# Patient Record
Sex: MALE | Race: BLACK OR AFRICAN AMERICAN | Employment: UNEMPLOYED | URBAN - METROPOLITAN AREA
[De-identification: names, ages, dates, MRNs, and addresses within clinical notes are randomized per-mention and may not be internally consistent; named-entity substitution may affect disease eponyms.]

---

## 2017-02-14 ENCOUNTER — APPOINTMENT (OUTPATIENT)
Dept: GENERAL RADIOLOGY | Age: 57
End: 2017-02-14
Attending: NURSE PRACTITIONER
Payer: MEDICARE

## 2017-02-14 ENCOUNTER — HOSPITAL ENCOUNTER (EMERGENCY)
Age: 57
Discharge: HOME | End: 2017-02-14
Attending: EMERGENCY MEDICINE
Payer: MEDICARE

## 2017-02-14 VITALS
HEART RATE: 74 BPM | SYSTOLIC BLOOD PRESSURE: 115 MMHG | OXYGEN SATURATION: 98 % | TEMPERATURE: 98 F | RESPIRATION RATE: 20 BRPM | DIASTOLIC BLOOD PRESSURE: 66 MMHG

## 2017-02-14 DIAGNOSIS — N18.6 ESRD (END STAGE RENAL DISEASE) ON DIALYSIS (HCC): Primary | ICD-10-CM

## 2017-02-14 DIAGNOSIS — Z99.2 ESRD (END STAGE RENAL DISEASE) ON DIALYSIS (HCC): Primary | ICD-10-CM

## 2017-02-14 DIAGNOSIS — R07.9 CHEST PAIN, UNSPECIFIED TYPE: ICD-10-CM

## 2017-02-14 LAB
ALBUMIN SERPL BCP-MCNC: 3.4 G/DL (ref 3.4–5)
ALBUMIN/GLOB SERPL: 0.7 {RATIO} (ref 0.8–1.7)
ALP SERPL-CCNC: 231 U/L (ref 45–117)
ALT SERPL-CCNC: 33 U/L (ref 16–61)
ANION GAP BLD CALC-SCNC: 7 MMOL/L (ref 3–18)
AST SERPL W P-5'-P-CCNC: 23 U/L (ref 15–37)
BASOPHILS # BLD AUTO: 0.1 K/UL (ref 0–0.06)
BASOPHILS # BLD: 2 % (ref 0–2)
BILIRUB DIRECT SERPL-MCNC: <0.1 MG/DL (ref 0–0.2)
BILIRUB SERPL-MCNC: 0.2 MG/DL (ref 0.2–1)
BNP SERPL-MCNC: 845 PG/ML (ref 0–900)
BUN SERPL-MCNC: 26 MG/DL (ref 7–18)
BUN/CREAT SERPL: 5 (ref 12–20)
CALCIUM SERPL-MCNC: 7.6 MG/DL (ref 8.5–10.1)
CHLORIDE SERPL-SCNC: 97 MMOL/L (ref 100–108)
CK MB CFR SERPL CALC: 0.5 % (ref 0–4)
CK MB CFR SERPL CALC: 0.5 % (ref 0–4)
CK MB SERPL-MCNC: 0.7 NG/ML (ref 0.5–3.6)
CK MB SERPL-MCNC: 0.8 NG/ML (ref 0.5–3.6)
CK SERPL-CCNC: 132 U/L (ref 39–308)
CK SERPL-CCNC: 158 U/L (ref 39–308)
CO2 SERPL-SCNC: 35 MMOL/L (ref 21–32)
CREAT SERPL-MCNC: 5.29 MG/DL (ref 0.6–1.3)
DIFFERENTIAL METHOD BLD: ABNORMAL
EOSINOPHIL # BLD: 0.1 K/UL (ref 0–0.4)
EOSINOPHIL NFR BLD: 2 % (ref 0–5)
ERYTHROCYTE [DISTWIDTH] IN BLOOD BY AUTOMATED COUNT: 14.6 % (ref 11.6–14.5)
GLOBULIN SER CALC-MCNC: 4.8 G/DL (ref 2–4)
GLUCOSE SERPL-MCNC: 122 MG/DL (ref 74–99)
HCT VFR BLD AUTO: 37.4 % (ref 36–48)
HGB BLD-MCNC: 12.6 G/DL (ref 13–16)
LIPASE SERPL-CCNC: 271 U/L (ref 73–393)
LYMPHOCYTES # BLD AUTO: 31 % (ref 21–52)
LYMPHOCYTES # BLD: 1.5 K/UL (ref 0.9–3.6)
MCH RBC QN AUTO: 33.1 PG (ref 24–34)
MCHC RBC AUTO-ENTMCNC: 33.7 G/DL (ref 31–37)
MCV RBC AUTO: 98.2 FL (ref 74–97)
MONOCYTES # BLD: 0.8 K/UL (ref 0.05–1.2)
MONOCYTES NFR BLD AUTO: 16 % (ref 3–10)
NEUTS SEG # BLD: 2.5 K/UL (ref 1.8–8)
NEUTS SEG NFR BLD AUTO: 49 % (ref 40–73)
PLATELET # BLD AUTO: 150 K/UL (ref 135–420)
PMV BLD AUTO: 9.2 FL (ref 9.2–11.8)
POTASSIUM SERPL-SCNC: 3.4 MMOL/L (ref 3.5–5.5)
PROT SERPL-MCNC: 8.2 G/DL (ref 6.4–8.2)
RBC # BLD AUTO: 3.81 M/UL (ref 4.7–5.5)
SODIUM SERPL-SCNC: 139 MMOL/L (ref 136–145)
TROPONIN I SERPL-MCNC: <0.02 NG/ML (ref 0–0.04)
TROPONIN I SERPL-MCNC: <0.02 NG/ML (ref 0–0.04)
WBC # BLD AUTO: 5 K/UL (ref 4.6–13.2)

## 2017-02-14 PROCEDURE — 74011250637 HC RX REV CODE- 250/637: Performed by: NURSE PRACTITIONER

## 2017-02-14 PROCEDURE — 80076 HEPATIC FUNCTION PANEL: CPT | Performed by: NURSE PRACTITIONER

## 2017-02-14 PROCEDURE — 99285 EMERGENCY DEPT VISIT HI MDM: CPT

## 2017-02-14 PROCEDURE — 93005 ELECTROCARDIOGRAM TRACING: CPT

## 2017-02-14 PROCEDURE — 82550 ASSAY OF CK (CPK): CPT | Performed by: NURSE PRACTITIONER

## 2017-02-14 PROCEDURE — 71010 XR CHEST PORT: CPT

## 2017-02-14 PROCEDURE — 83690 ASSAY OF LIPASE: CPT | Performed by: NURSE PRACTITIONER

## 2017-02-14 PROCEDURE — 80048 BASIC METABOLIC PNL TOTAL CA: CPT | Performed by: NURSE PRACTITIONER

## 2017-02-14 PROCEDURE — 85025 COMPLETE CBC W/AUTO DIFF WBC: CPT | Performed by: NURSE PRACTITIONER

## 2017-02-14 PROCEDURE — 83880 ASSAY OF NATRIURETIC PEPTIDE: CPT | Performed by: NURSE PRACTITIONER

## 2017-02-14 RX ORDER — BENZONATATE 100 MG/1
100 CAPSULE ORAL
COMMUNITY

## 2017-02-14 RX ORDER — QUETIAPINE FUMARATE 25 MG/1
25 TABLET, FILM COATED ORAL 2 TIMES DAILY
COMMUNITY

## 2017-02-14 RX ORDER — CINACALCET 90 MG/1
TABLET, FILM COATED ORAL
COMMUNITY

## 2017-02-14 RX ORDER — ONDANSETRON 4 MG/1
4 TABLET, ORALLY DISINTEGRATING ORAL
COMMUNITY

## 2017-02-14 RX ORDER — HYDROMORPHONE HYDROCHLORIDE 1 MG/ML
1 INJECTION, SOLUTION INTRAMUSCULAR; INTRAVENOUS; SUBCUTANEOUS
Status: DISCONTINUED | OUTPATIENT
Start: 2017-02-14 | End: 2017-02-14

## 2017-02-14 RX ORDER — SEVELAMER CARBONATE 800 MG/1
800 TABLET, FILM COATED ORAL 3 TIMES DAILY
COMMUNITY

## 2017-02-14 RX ORDER — PHENYTOIN SODIUM 100 MG/1
100 CAPSULE, EXTENDED RELEASE ORAL
COMMUNITY

## 2017-02-14 RX ORDER — GUAIFENESIN 100 MG/5ML
81 LIQUID (ML) ORAL
Status: COMPLETED | OUTPATIENT
Start: 2017-02-14 | End: 2017-02-14

## 2017-02-14 RX ORDER — OMEPRAZOLE 20 MG/1
20 CAPSULE, DELAYED RELEASE ORAL DAILY
COMMUNITY

## 2017-02-14 RX ORDER — GLUCOSAMINE SULFATE 1500 MG
POWDER IN PACKET (EA) ORAL DAILY
COMMUNITY

## 2017-02-14 RX ORDER — TAMSULOSIN HYDROCHLORIDE 0.4 MG/1
0.4 CAPSULE ORAL DAILY
COMMUNITY

## 2017-02-14 RX ORDER — QUETIAPINE FUMARATE 25 MG/1
50 TABLET, FILM COATED ORAL
COMMUNITY

## 2017-02-14 RX ORDER — ACETAMINOPHEN 500 MG
500 TABLET ORAL
COMMUNITY

## 2017-02-14 RX ORDER — LORAZEPAM 1 MG/1
1 TABLET ORAL
COMMUNITY

## 2017-02-14 RX ADMIN — ASPIRIN 81 MG 81 MG: 81 TABLET ORAL at 17:30

## 2017-02-14 NOTE — ED TRIAGE NOTES
Per EMS, \"he started complaining of chest pain when he had 8 minutes left of dialysis. He answers yes to every question so is hard to determine how long he has had the chest pain for. \"

## 2017-02-14 NOTE — ED PROVIDER NOTES
HPI Comments:   5:02 PM   64 y.o. male presents to ED C/O chest pain. Patient has a HX of down syndrome, and dialysis patient. Patient brought in by EMS from dialysis for a complaint of chest pain. Per EMS patient started complaining of chest pain, 8 minutes prior to end of his normal dialysis and they stopped and called 911. When patient asked about pain he reports yes to chest pain and point to midsternal region, however when asked if he was having abdominal pain he said yes and then pointed to his stomach, unsure if patient is truly reporting pain or if he is following what he thinks are directions. Patient's guardian, his cousin arrived approx 30 minutes after patient arrived via EMS, she reports patient has complained about chest pain in the past, she thinks he like the attention and going to hospitals, patient has no been to hospital in over a year. Patient is a nonsmoker. Pt denies any other sxs or complaints. Written by Michaela CABRERA      The history is provided by the patient. The history is limited by a developmental delay and the absence of a caregiver. Past Medical History:   Diagnosis Date    Dialysis patient [facility])     Down syndrome        Past Surgical History:   Procedure Laterality Date    Hx vascular access           History reviewed. No pertinent family history. Social History     Social History    Marital status: N/A     Spouse name: N/A    Number of children: N/A    Years of education: N/A     Occupational History    Not on file.      Social History Main Topics    Smoking status: Unknown If Ever Smoked    Smokeless tobacco: Not on file    Alcohol use Not on file    Drug use: Not on file    Sexual activity: Not on file     Other Topics Concern    Not on file     Social History Narrative    No narrative on file         ALLERGIES: Lactose    Review of Systems   Unable to perform ROS: Other       Vitals:    02/14/17 1650   Pulse: 72   Resp: 15   Temp: 98 °F (36.7 °C)   SpO2: 100%            Physical Exam   Constitutional: He is oriented to person, place, and time. He appears well-developed and well-nourished. No distress. Patient smiling appears in no distress. HENT:   Head: Normocephalic and atraumatic. Right Ear: External ear normal.   Left Ear: External ear normal.   Mouth/Throat: Oropharynx is clear and moist.   Eyes: Conjunctivae and EOM are normal.   Cardiovascular: Normal rate and regular rhythm. Murmur heard. AV fistula left forearm, thrill intact. Pulmonary/Chest: Effort normal and breath sounds normal. No respiratory distress. He has no wheezes. He has no rales. Abdominal: Soft. Normal appearance and bowel sounds are normal. He exhibits no distension. There is generalized tenderness. There is no rebound. Musculoskeletal: Normal range of motion. Neurological: He is alert and oriented to person, place, and time. He exhibits normal muscle tone. Coordination normal.   Skin: Skin is warm and dry. No rash noted. He is not diaphoretic. No erythema. No pallor. Psychiatric: He has a normal mood and affect. His behavior is normal. Judgment and thought content normal.   Nursing note and vitals reviewed. MDM  Number of Diagnoses or Management Options  Chest pain, unspecified type:   ESRD (end stage renal disease) on dialysis [facility]):   Diagnosis management comments: DDX:  Atypical chest pain, dehydration, electrolyte abnormality, cardiac arrhythmia, bronchitis, pneumonia, cholecystosis, pancreatitis, bowel obstruction. MDM:  Plan - CBC, CMP, lipase, chest xray, EKG, cardiac panel  6:39 PM Patient turned over to [facility] pending second set of cardiac enzymes which have been ordered, BNP ,and chest xray.   Sallyann Riedel, NP        Amount and/or Complexity of Data Reviewed  Clinical lab tests: ordered  Tests in the radiology section of CPT®: ordered    Risk of Complications, Morbidity, and/or Mortality  Presenting problems: moderate  Diagnostic procedures: moderate  Management options: moderate      ED Course       Procedures             RESULTS:    XR CHEST PORT    (Results Pending)       Labs Reviewed   CBC WITH AUTOMATED DIFF   METABOLIC PANEL, BASIC   CARDIAC PANEL,(CK, CKMB & TROPONIN)       Recent Results (from the past 12 hour(s))   EKG, 12 LEAD, INITIAL    Collection Time: 02/14/17  4:51 PM   Result Value Ref Range    Ventricular Rate 71 BPM    Atrial Rate 71 BPM    P-R Interval 202 ms    QRS Duration 92 ms    Q-T Interval 424 ms    QTC Calculation (Bezet) 460 ms    Calculated P Axis 46 degrees    Calculated R Axis 3 degrees    Calculated T Axis 16 degrees    Diagnosis       Normal sinus rhythm  Normal ECG  No previous ECGs available         PROGRESS NOTE:   5:03 PM   Initial assessment completed. Written by Eugenio CABRERA     Disposition:  Turned over to [facility] pending cardiac panel and BNP. Ron Banerjee NP 6:42 PM       Recent Results (from the past 12 hour(s))   EKG, 12 LEAD, INITIAL    Collection Time: 02/14/17  4:51 PM   Result Value Ref Range    Ventricular Rate 71 BPM    Atrial Rate 71 BPM    P-R Interval 202 ms    QRS Duration 92 ms    Q-T Interval 424 ms    QTC Calculation (Bezet) 460 ms    Calculated P Axis 46 degrees    Calculated R Axis 3 degrees    Calculated T Axis 16 degrees    Diagnosis       Normal sinus rhythm  Normal ECG  No previous ECGs available     CBC WITH AUTOMATED DIFF    Collection Time: 02/14/17  4:54 PM   Result Value Ref Range    WBC 5.0 4.6 - 13.2 K/uL    RBC 3.81 (L) 4.70 - 5.50 M/uL    HGB 12.6 (L) 13.0 - 16.0 g/dL    HCT 37.4 36.0 - 48.0 %    MCV 98.2 (H) 74.0 - 97.0 FL    MCH 33.1 24.0 - 34.0 PG    MCHC 33.7 31.0 - 37.0 g/dL    RDW 14.6 (H) 11.6 - 14.5 %    PLATELET 121 320 - 168 K/uL    MPV 9.2 9.2 - 11.8 FL    NEUTROPHILS 49 40 - 73 %    LYMPHOCYTES 31 21 - 52 %    MONOCYTES 16 (H) 3 - 10 %    EOSINOPHILS 2 0 - 5 %    BASOPHILS 2 0 - 2 %    ABS.  NEUTROPHILS 2.5 1.8 - 8.0 K/UL    ABS. LYMPHOCYTES 1.5 0.9 - 3.6 K/UL    ABS. MONOCYTES 0.8 0.05 - 1.2 K/UL    ABS. EOSINOPHILS 0.1 0.0 - 0.4 K/UL    ABS. BASOPHILS 0.1 (H) 0.0 - 0.06 K/UL    DF AUTOMATED     METABOLIC PANEL, BASIC    Collection Time: 02/14/17  4:54 PM   Result Value Ref Range    Sodium 139 136 - 145 mmol/L    Potassium 3.4 (L) 3.5 - 5.5 mmol/L    Chloride 97 (L) 100 - 108 mmol/L    CO2 35 (H) 21 - 32 mmol/L    Anion gap 7 3.0 - 18 mmol/L    Glucose 122 (H) 74 - 99 mg/dL    BUN 26 (H) 7.0 - 18 MG/DL    Creatinine 5.29 (H) 0.6 - 1.3 MG/DL    BUN/Creatinine ratio 5 (L) 12 - 20      GFR est AA 14 (L) >60 ml/min/1.73m2    GFR est non-AA 11 (L) >60 ml/min/1.73m2    Calcium 7.6 (L) 8.5 - 10.1 MG/DL   CARDIAC PANEL,(CK, CKMB & TROPONIN)    Collection Time: 02/14/17  4:54 PM   Result Value Ref Range     39 - 308 U/L    CK - MB 0.8 0.5 - 3.6 ng/ml    CK-MB Index 0.5 0.0 - 4.0 %    Troponin-I, Qt. <0.02 0.0 - 0.045 NG/ML   HEPATIC FUNCTION PANEL    Collection Time: 02/14/17  4:54 PM   Result Value Ref Range    Protein, total 8.2 6.4 - 8.2 g/dL    Albumin 3.4 3.4 - 5.0 g/dL    Globulin 4.8 (H) 2.0 - 4.0 g/dL    A-G Ratio 0.7 (L) 0.8 - 1.7      Bilirubin, total 0.2 0.2 - 1.0 MG/DL    Bilirubin, direct <0.1 0.0 - 0.2 MG/DL    Alk. phosphatase 231 (H) 45 - 117 U/L    AST (SGOT) 23 15 - 37 U/L    ALT (SGPT) 33 16 - 61 U/L   LIPASE    Collection Time: 02/14/17  4:54 PM   Result Value Ref Range    Lipase 271 73 - 393 U/L   PRO-BNP    Collection Time: 02/14/17  4:54 PM   Result Value Ref Range    NT pro- 0 - 900 PG/ML   CARDIAC PANEL,(CK, CKMB & TROPONIN)    Collection Time: 02/14/17  8:10 PM   Result Value Ref Range     39 - 308 U/L    CK - MB 0.7 0.5 - 3.6 ng/ml    CK-MB Index 0.5 0.0 - 4.0 %    Troponin-I, Qt. <0.02 0.0 - 0.045 NG/ML     8:55 PM  Diagnosis:   1.  ESRD (end stage renal disease) on dialysis ([facility] [address]    2. Chest pain, unspecified type          Disposition: home    Follow-up Information     Follow up With Details Comments Contact Info    Zhane Oconnell MD Schedule an appointment as soon as possible for a visit in 2 days  [address]  [phone]        If symptoms worsen return immediately     Grisel James MD Call in 2 days  Katlyn Godfrey [address]  [phone]            Patient's Medications   Start Taking    No medications on file   Continue Taking    ACETAMINOPHEN (TYLENOL) 500 MG TABLET    Take 500 mg by mouth every six (6) hours as needed for Pain. B COMPLEX-VITAMIN C-FOLIC ACID (NEPHROCAPS) 1 MG CAPSULE    Take 1 Cap by mouth daily. BENZONATATE (TESSALON PERLES) 100 MG CAPSULE    Take 100 mg by mouth three (3) times daily as needed for Cough. CHOLECALCIFEROL (VITAMIN D3) 1,000 UNIT CAP    Take  by mouth daily. CINACALCET (SENSIPAR) 90 MG TAB    Take  by mouth. LORAZEPAM (ATIVAN) 1 MG TABLET    Take 1 mg by mouth every four (4) hours as needed for Anxiety. OMEPRAZOLE (PRILOSEC) 20 MG CAPSULE    Take 20 mg by mouth daily. ONDANSETRON (ZOFRAN ODT) 4 MG DISINTEGRATING TABLET    Take 4 mg by mouth every eight (8) hours as needed for Nausea. PHENYTOIN ER (DILANTIN ER) 100 MG ER CAPSULE    Take 100 mg by mouth. QUETIAPINE (SEROQUEL) 25 MG TABLET    Take 25 mg by mouth two (2) times a day. QUETIAPINE (SEROQUEL) 25 MG TABLET    Take 50 mg by mouth nightly. SEVELAMER CARBONATE (RENVELA) 800 MG TAB TAB    Take 800 mg by mouth three (3) times daily. TAMSULOSIN (FLOMAX) 0.4 MG CAPSULE    Take 0.4 mg by mouth daily.    These Medications have changed    No medications on file   Stop Taking    No medications on file

## 2017-02-15 LAB
ATRIAL RATE: 71 BPM
CALCULATED P AXIS, ECG09: 46 DEGREES
CALCULATED R AXIS, ECG10: 3 DEGREES
CALCULATED T AXIS, ECG11: 16 DEGREES
DIAGNOSIS, 93000: NORMAL
P-R INTERVAL, ECG05: 202 MS
Q-T INTERVAL, ECG07: 424 MS
QRS DURATION, ECG06: 92 MS
QTC CALCULATION (BEZET), ECG08: 460 MS
VENTRICULAR RATE, ECG03: 71 BPM

## 2017-02-15 NOTE — DISCHARGE INSTRUCTIONS
Chest Pain: Care Instructions  Your Care Instructions  There are many things that can cause chest pain. Some are not serious and will get better on their own in a few days. But some kinds of chest pain need more testing and treatment. Your doctor may have recommended a follow-up visit in the next 8 to 12 hours. If you are not getting better, you may need more tests or treatment. Even though your doctor has released you, you still need to watch for any problems. The doctor carefully checked you, but sometimes problems can develop later. If you have new symptoms or if your symptoms do not get better, get medical care right away. If you have worse or different chest pain or pressure that lasts more than 5 minutes or you passed out (lost consciousness), call 911 or seek other emergency help right away. A medical visit is only one step in your treatment. Even if you feel better, you still need to do what your doctor recommends, such as going to all suggested follow-up appointments and taking medicines exactly as directed. This will help you recover and help prevent future problems. How can you care for yourself at home? · Rest until you feel better. · Take your medicine exactly as prescribed. Call your doctor if you think you are having a problem with your medicine. · Do not drive after taking a prescription pain medicine. When should you call for help? Call 911 if:  · You passed out (lost consciousness). · You have severe difficulty breathing. · You have symptoms of a heart attack. These may include:  ¨ Chest pain or pressure, or a strange feeling in your chest.  ¨ Sweating. ¨ Shortness of breath. ¨ Nausea or vomiting. ¨ Pain, pressure, or a strange feeling in your back, neck, jaw, or upper belly or in one or both shoulders or arms. ¨ Lightheadedness or sudden weakness. ¨ A fast or irregular heartbeat.   After you call 911, the  may tell you to chew 1 adult-strength or 2 to 4 low-dose aspirin. Wait for an ambulance. Do not try to drive yourself. Call your doctor today if:  · You have any trouble breathing. · Your chest pain gets worse. · You are dizzy or lightheaded, or you feel like you may faint. · You are not getting better as expected. · You are having new or different chest pain. Where can you learn more? Go to http://marivel-elvira.info/. Enter A120 in the search box to learn more about \"Chest Pain: Care Instructions. \"  Current as of: May 27, 2016  Content Version: 11.1  © 1185-8505 [employer]. Care instructions adapted under license by [employer] (which disclaims liability or warranty for this information). If you have questions about a medical condition or this instruction, always ask your healthcare professional. [address] any warranty or liability for your use of this information. Your Hemodialysis Access: Care Instructions  Your Care Instructions  Hemodialysis, or dialysis, is the use of a machine to remove wastes from your blood. You need it if your kidneys are not able to remove wastes on their own. A dialysis access is the place in your arm, or sometimes in your leg, where a doctor creates a blood vessel that carries a large flow of blood. When you have dialysis, two needles are placed in this blood vessel and are connected to the dialysis machine. Your blood flows out of one needle and into the machine to be cleaned. Then your cleaned blood flows back into your body through the other needle. Sometimes, a doctor makes a short-term access through a tube, called a catheter, placed in your neck, upper chest, or groin. Your doctor creates an access during a minor surgery. You need to take care of your access to keep it working and to prevent infection. Follow-up care is a key part of your treatment and safety. Be sure to make and go to all appointments, and call your doctor if you are having problems. Its also a good idea to know your test results and keep a list of the medicines you take. How can you care for yourself at home? · After your doctor creates an access, keep it dry for at least 2 days. · Squeeze a soft ball or other object as instructed after the access is placed. This will help blood flow through the access and help prevent blood clots. · After you have dialysis, check to see whether the access bleeds or swells. Let your doctor know if your arm bleeds or swells. · Do not lift anything heavy with the arm that has the access. · Do not bump your arm. · Do not wear tight clothing or jewelry over the access. · Do not sleep with your access arm under your body. · Have blood drawn or blood pressure taken from your other arm. · Keep the access clean and dry. · Do not put cream or lotion on or near the access. When should you call for help? Call your doctor now or seek immediate medical care if:  · You have signs of infection, such as:  ¨ Increased pain, swelling, warmth, or redness around the access. ¨ Red streaks leading from the access. ¨ Pus draining from the access. ¨ Swollen lymph nodes in your neck, armpits, or groin. ¨ A fever. · You do not feel a pulse in your access. Watch closely for changes in your health, and be sure to contact your doctor if:  · You have pain, swelling, or bleeding. Some pain or swelling is normal after surgery to create the access. But pain and swelling should get better over time. Where can you learn more? Go to http://marivel-elvira.info/. Enter L169 in the search box to learn more about \"Your Hemodialysis Access: Care Instructions. \"  Current as of: November 20, 2015  Content Version: 11.1  © 5637-0856 [employer]. Care instructions adapted under license by [employer] (which disclaims liability or warranty for this information).  If you have questions about a medical condition or this instruction, always ask your healthcare professional. [place] [address] any warranty or liability for your use of this information.

## 2017-02-15 NOTE — ED NOTES
Purposeful rounding completed:    Side rails up x 2:  YES  Bed in low position and wheels locked: YES  Call bell within reach: YES  Comfort addressed: YES    Toileting needs addressed: YES  Plan of care reviewed/updated with patient and or family members: YES  IV site assessed: YES  Pain assessed and addressed: YES, 0. Patient denying pain at this time.

## 2019-02-13 ENCOUNTER — OFFICE VISIT (OUTPATIENT)
Dept: CARDIOLOGY CLINIC | Age: 59
End: 2019-02-13

## 2019-02-13 VITALS
HEART RATE: 64 BPM | DIASTOLIC BLOOD PRESSURE: 47 MMHG | HEIGHT: 65 IN | SYSTOLIC BLOOD PRESSURE: 104 MMHG | WEIGHT: 147 LBS | OXYGEN SATURATION: 96 % | BODY MASS INDEX: 24.49 KG/M2

## 2019-02-13 DIAGNOSIS — R94.31 ABNORMAL EKG: ICD-10-CM

## 2019-02-13 DIAGNOSIS — R07.89 OTHER CHEST PAIN: Primary | ICD-10-CM

## 2019-02-13 DIAGNOSIS — R06.02 SHORTNESS OF BREATH: ICD-10-CM

## 2019-02-13 NOTE — PROGRESS NOTES
Mr. [place] is a 80-year-old male with a history of end-stage renal disease on hemodialysis. He has history of trisomy 21 associated with mental retardation. Patient was sent to me for evaluation of abnormal EKG. Patient has mental retardation as not being able to provide any history. History was obtained from available medical records and the caregiver who is accompanied with the patient today  Patient had a recent ER visit with possible gastroenteritis and flulike symptoms. He also had a EKG. Because of abnormal EKG he was asked to come see me. There is no history of coronary artery disease or congestive heart failure. Upon questioning to patient, he does not appear to have any symptoms related to angina. The caregiver who is accompanied with the patient does not recall any symptoms concerning for angina or heart failure at the facility. He goes to dialysis 3 times a week    PMH:  Past Medical History:   Diagnosis Date    BPH (benign prostatic hyperplasia)     Down syndrome     ESRD on dialysis ([facility] [address] esrd    GERD (gastroesophageal reflux disease)     Hiatal hernia per caregiver    Mental retardation     Seizure disorder (HCC)     Trisomy 21 syndrome      PSH:  Past Surgical History:   Procedure Laterality Date    HX VASCULAR ACCESS      IR ANGIO AV SHUNT LT HEMODIALYSIS LTD VIA EXT CATH       MEDS:  Current Outpatient Medications   Medication Sig Dispense Refill    acetaminophen (TYLENOL) 500 mg tablet Take 500 mg by mouth every six (6) hours as needed for Pain.  LORazepam (ATIVAN) 1 mg tablet Take 1 mg by mouth every four (4) hours as needed for Anxiety.  b complex-vitamin c-folic acid (NEPHROCAPS) 1 mg capsule Take 1 Cap by mouth daily.  omeprazole (PRILOSEC) 20 mg capsule Take 20 mg by mouth daily.  phenytoin ER (DILANTIN ER) 100 mg ER capsule Take 100 mg by mouth.  QUEtiapine (SEROQUEL) 25 mg tablet Take 25 mg by mouth two (2) times a day.       QUEtiapine (SEROQUEL) 25 mg tablet Take 50 mg by mouth nightly.  sevelamer carbonate (RENVELA) 800 mg tab tab Take 800 mg by mouth three (3) times daily.  cinacalcet (SENSIPAR) 90 mg tab Take  by mouth.  tamsulosin (FLOMAX) 0.4 mg capsule Take 0.4 mg by mouth daily.  benzonatate (TESSALON PERLES) 100 mg capsule Take 100 mg by mouth three (3) times daily as needed for Cough.  cholecalciferol (VITAMIN D3) 1,000 unit cap Take  by mouth daily.  ondansetron (ZOFRAN ODT) 4 mg disintegrating tablet Take 4 mg by mouth every eight (8) hours as needed for Nausea.  aspirin delayed-release 81 mg tablet Take 1 Tab by mouth daily. 90 Tab 3    calcitRIOL (ROCALTROL) 0.25 mcg capsule Take 0.25 mcg by mouth daily.  calcium acetate (PHOSLO) 667 mg Cap Take  by mouth three (3) times daily (with meals).  LORazepam (ATIVAN) 1 mg tablet Take  by mouth two (2) times a day.  omeprazole (PRILOSEC) 40 mg capsule Take 40 mg by mouth daily.  phenytoin sodium prompt (DILANTIN) 100 mg Cap Take 200 mg by mouth two (2) times a day.  polyethylene glycol (MIRALAX) 17 gram packet Take 17 g by mouth daily.  epoetin kaitlynn (PROCRIT) 20,000 unit/mL injection by SubCUTAneous route.  sevelamer carbonate (RENVELA) 800 mg Tab tab Take  by mouth three (3) times daily.  QUEtiapine (SEROQUEL) 25 mg tablet Take  by mouth.  tamsulosin (FLOMAX) 0.4 mg capsule Take 0.4 mg by mouth daily.  cholecalciferol, vitamin d3, (VITAMIN D) 1,000 unit tablet Take  by mouth daily.  calcium carbonate (ANTACID EXTRA-STRENGTH) 200 mg calcium (500 mg) Chew Take 1 Tab by mouth daily.  acetaminophen (TYLENOL) 325 mg tablet Take  by mouth every four (4) hours as needed.  magnesium hydroxide (KIMBLE MILK OF MAGNESIA) 400 mg/5 mL suspension Take 30 mL by mouth daily as needed.  guaiFENesin (ROBITUSSIN) 100 mg/5 mL liquid Take 200 mg by mouth three (3) times daily as needed.  lidocaine-prilocaine (EMLA) topical cream Apply  to affected area as needed. Allergies and Sensitivities:  Allergies   Allergen Reactions    Lactose Diarrhea       Family History:  No family history on file. Social History:  Social History     Tobacco Use    Smoking status: Unknown If Ever Smoked    Smokeless tobacco: Never Used   Substance Use Topics    Alcohol use: No    Drug use: No     Review of Systems:  As above in HPI, otherwise 10 point review negative. Physical Exam:  BP Readings from Last 3 Encounters:   02/13/19 104/47   02/14/17 115/66   05/16/13 110/62     Pulse Readings from Last 3 Encounters:   02/13/19 64   02/14/17 74   05/16/13 91     Wt Readings from Last 3 Encounters:   02/13/19 147 lb (66.7 kg)   05/16/13 150 lb (68 kg)   05/14/13 153 lb 3.5 oz (69.5 kg)     Constitutional: Alert and awake. Neck: No JVD present. Cardiovascular: Regular rhythm. No murmur, gallop or rubs appriciated  Lung[de-identified] Breath sounds normal. No respiratory distress. No ronchi or rales appriciated  Abdominal: No tenderness. No rebound and no guarding. Musculoskeletal: There is no edema. No cynosis  Able to move all extremity without any problem. Mild discomfort on palpation of right upper chest and right shoulder area    No results found for: CHOL  Review of Data:  EKG:(02/2012) SInus rhythm at 74 bpm. Non-specific interventricular conduction delay. No q waves. No ST-T changes of ischemia. Rightward axis. ECHO(02/2012)  LEFT VENTRICLE: Size was normal. Systolic function was normal. Ejection  fraction was estimated in the range of 55 % to 60 %. No obvious wall    motion abnormalities identified in the views obtained.  Wall thickness was  normal.  RIGHT VENTRICLE: The size was at the upper limits of normal. Systolic  function was normal.    LEFT ATRIUM: Size was normal.    ATRIAL SEPTUM: There was no evidence of intracardiac shunt by  peripherally-administered agitated saline contrast.    RIGHT ATRIUM: Size was at the upper limits of normal.    MITRAL VALVE: There was  no evidence for stenosis. There was trivial regurgitation. AORTIC VALVE: There was no stenosis. There was no regurgitation. TRICUSPID VALVE:There was no evidence for tricuspid stenosis. There was trivial  regurgitation. Right atrial pressure estimate: 10 mmHg. Pulmonary artery  systolic pressure: 30 mmHg. PULMONIC VALVE:The transpulmonic velocity was within the normal range. There was trivial  regurgitation. PERICARDIUM: A small pericardial effusion was identified anterior to the  heart. There was no evidence of hemodynamic compromise. Impression / Plan:  Cuong Son is a 62 y.o. male with a history of trisomy 21, mental retardation, end-stage renal disease    Abnormal EKG:  Patient had a EKG in December 2018 which showed T wave inversion in V1 and V2. I reviewed this EKG personally which showed sinus rhythm. There was T wave inversion in lead V1 and V2 in setting of borderline prolongation of QRS segment. This could be incomplete right bundle branch block pattern. Currently patient or the caregiver does not report any symptoms to be concern of angina or heart failure. I will proceed with echocardiogram for the completeness. On exam there is no evidence of fluid overload. End-stage renal disease:  Currently he is on dialysis and I will defer this management to renal team    Follow up after testing.

## 2019-02-13 NOTE — PATIENT INSTRUCTIONS
Douglas Jama will call to schedule your testing within 24-48 hours. If you do not hear from her, then please call her directly at [phone].     All testing/lab work is completed in [address] [place] [address] 150 at [facility]/\[facility]\""

## 2019-03-06 ENCOUNTER — HOSPITAL ENCOUNTER (OUTPATIENT)
Dept: NON INVASIVE DIAGNOSTICS | Age: 59
Discharge: HOME | End: 2019-03-06
Attending: INTERNAL MEDICINE
Payer: MEDICARE

## 2019-03-06 VITALS — WEIGHT: 147 LBS | BODY MASS INDEX: 24.49 KG/M2 | HEIGHT: 65 IN

## 2019-03-06 DIAGNOSIS — R94.31 ABNORMAL EKG: ICD-10-CM

## 2019-03-06 LAB — ECHO PULMONARY ARTERY SYSTOLIC PRESSURE (PASP): 26 MMHG

## 2019-03-06 PROCEDURE — 93306 TTE W/DOPPLER COMPLETE: CPT

## 2019-03-13 ENCOUNTER — OFFICE VISIT (OUTPATIENT)
Dept: CARDIOLOGY CLINIC | Age: 59
End: 2019-03-13

## 2019-03-13 VITALS
BODY MASS INDEX: 24.16 KG/M2 | OXYGEN SATURATION: 98 % | HEIGHT: 65 IN | WEIGHT: 145 LBS | DIASTOLIC BLOOD PRESSURE: 61 MMHG | SYSTOLIC BLOOD PRESSURE: 97 MMHG | HEART RATE: 71 BPM

## 2019-03-13 DIAGNOSIS — R94.31 ABNORMAL EKG: Primary | ICD-10-CM

## 2019-03-13 NOTE — PROGRESS NOTES
Mr. Recinos is a 49-year-old male with a history of end-stage renal disease on hemodialysis. He has history of trisomy 21 associated with mental retardation. Patient was referred to our office for evaluation of abnormal EKG. Patient has mental retardation as not being able to provide any history. History was obtained from available medical records and the caregiver who is accompanied with the patient today. Patient had a recent ER visit with possible gastroenteritis and flu-like symptoms. He also had a EKG. There is no history of coronary artery disease or congestive heart failure. The caregiver denies patient expressing any symptoms since his last visit here. He goes to dialysis 3 times a week. PMH:  Past Medical History:   Diagnosis Date    BPH (benign prostatic hyperplasia)     Down syndrome     ESRD on dialysis (HCC) esrd    GERD (gastroesophageal reflux disease)     Hiatal hernia per caregiver    Mental retardation     Seizure disorder (HCC)     Trisomy 21 syndrome      PSH:  Past Surgical History:   Procedure Laterality Date    HX VASCULAR ACCESS      IR ANGIO AV SHUNT LT HEMODIALYSIS LTD VIA EXT CATH       MEDS:  Current Outpatient Medications   Medication Sig Dispense Refill    acetaminophen (TYLENOL) 500 mg tablet Take 500 mg by mouth every six (6) hours as needed for Pain.  LORazepam (ATIVAN) 1 mg tablet Take 1 mg by mouth every four (4) hours as needed for Anxiety.  b complex-vitamin c-folic acid (NEPHROCAPS) 1 mg capsule Take 1 Cap by mouth daily.  omeprazole (PRILOSEC) 20 mg capsule Take 20 mg by mouth daily.  phenytoin ER (DILANTIN ER) 100 mg ER capsule Take 100 mg by mouth.  QUEtiapine (SEROQUEL) 25 mg tablet Take 25 mg by mouth two (2) times a day.  QUEtiapine (SEROQUEL) 25 mg tablet Take 50 mg by mouth nightly.  sevelamer carbonate (RENVELA) 800 mg tab tab Take 800 mg by mouth three (3) times daily.       cinacalcet (SENSIPAR) 90 mg tab Take by mouth.  tamsulosin (FLOMAX) 0.4 mg capsule Take 0.4 mg by mouth daily.  benzonatate (TESSALON PERLES) 100 mg capsule Take 100 mg by mouth three (3) times daily as needed for Cough.  cholecalciferol (VITAMIN D3) 1,000 unit cap Take  by mouth daily.  ondansetron (ZOFRAN ODT) 4 mg disintegrating tablet Take 4 mg by mouth every eight (8) hours as needed for Nausea.  aspirin delayed-release 81 mg tablet Take 1 Tab by mouth daily. 90 Tab 3    calcitRIOL (ROCALTROL) 0.25 mcg capsule Take 0.25 mcg by mouth daily.  calcium acetate (PHOSLO) 667 mg Cap Take  by mouth three (3) times daily (with meals).  LORazepam (ATIVAN) 1 mg tablet Take  by mouth two (2) times a day.  omeprazole (PRILOSEC) 40 mg capsule Take 40 mg by mouth daily.  phenytoin sodium prompt (DILANTIN) 100 mg Cap Take 200 mg by mouth two (2) times a day.  polyethylene glycol (MIRALAX) 17 gram packet Take 17 g by mouth daily.  epoetin kaitlynn (PROCRIT) 20,000 unit/mL injection by SubCUTAneous route.  sevelamer carbonate (RENVELA) 800 mg Tab tab Take  by mouth three (3) times daily.  QUEtiapine (SEROQUEL) 25 mg tablet Take  by mouth.  tamsulosin (FLOMAX) 0.4 mg capsule Take 0.4 mg by mouth daily.  cholecalciferol, vitamin d3, (VITAMIN D) 1,000 unit tablet Take  by mouth daily.  calcium carbonate (ANTACID EXTRA-STRENGTH) 200 mg calcium (500 mg) Chew Take 1 Tab by mouth daily.  acetaminophen (TYLENOL) 325 mg tablet Take  by mouth every four (4) hours as needed.  magnesium hydroxide (KIMBLE MILK OF MAGNESIA) 400 mg/5 mL suspension Take 30 mL by mouth daily as needed.  guaiFENesin (ROBITUSSIN) 100 mg/5 mL liquid Take 200 mg by mouth three (3) times daily as needed.  lidocaine-prilocaine (EMLA) topical cream Apply  to affected area as needed.          Allergies and Sensitivities:  Allergies   Allergen Reactions    Lactose Diarrhea Family History:  No family history on file. Social History:  Social History     Tobacco Use    Smoking status: Unknown If Ever Smoked    Smokeless tobacco: Never Used   Substance Use Topics    Alcohol use: No    Drug use: No     Review of Systems:  As above in HPI, otherwise 10 point review negative. Physical Exam:  BP Readings from Last 3 Encounters:   03/13/19 97/61   02/13/19 104/47   02/14/17 115/66     Pulse Readings from Last 3 Encounters:   03/13/19 71   02/13/19 64   02/14/17 74     Wt Readings from Last 3 Encounters:   03/13/19 145 lb (65.8 kg)   03/06/19 147 lb (66.7 kg)   02/13/19 147 lb (66.7 kg)     Constitutional: Alert and awake. Neck: No JVD present. No carotid bruit. Cardiovascular: Regular rhythm. No murmur, gallop or rubs appriciated. Lung: Breath sounds normal. No respiratory distress. No rhonchi or rales appreciated  Abdominal: No tenderness. No rebound and no guarding. Musculoskeletal: There is no edema. No cyanosis      No results found for: CHOL  Review of Data:  EKG:(02/2012) SInus rhythm at 74 bpm. Non-specific interventricular conduction delay. No q waves. No ST-T changes of ischemia. Rightward axis. ECHO (03/2019)  · Left ventricular low normal systolic function. Calculated left ventricular ejection fraction is 50%. Visually measured ejection fraction. No regional wall motion abnormality noted. Age-appropriate left ventricular diastolic function. · Non-specific thickening of the tricuspid valve. Mild tricuspid valve regurgitation is present. There is no evidence of pulmonary hypertension. · Trivial pericardial effusion. · Mitral valve non-specific thickening. · Mildly elevated central venous pressure (5-10 mmHg); IVC diameter is less than 21 mm and collapses less than 50% with respiration. ECHO(02/2012)  LEFT VENTRICLE: Size was normal. Systolic function was normal. Ejection  fraction was estimated in the range of 55 % to 60 %.  No obvious wall    motion abnormalities identified in the views obtained. Wall thickness was  normal.  RIGHT VENTRICLE: The size was at the upper limits of normal. Systolic  function was normal.    LEFT ATRIUM: Size was normal.    ATRIAL SEPTUM: There was no evidence of intracardiac shunt by  peripherally-administered agitated saline contrast.    RIGHT ATRIUM: Size was at the upper limits of normal.    MITRAL VALVE: There was  no evidence for stenosis. There was trivial regurgitation. AORTIC VALVE: There was no stenosis. There was no regurgitation. TRICUSPID VALVE:There was no evidence for tricuspid stenosis. There was trivial  regurgitation. Right atrial pressure estimate: 10 mmHg. Pulmonary artery  systolic pressure: 30 mmHg. PULMONIC VALVE:The transpulmonic velocity was within the normal range. There was trivial  regurgitation. PERICARDIUM: A small pericardial effusion was identified anterior to the  heart. There was no evidence of hemodynamic compromise. Impression / Plan:  Ana Hernandez is a 62 y.o. male with a history of trisomy 21, mental retardation, end-stage renal disease    Abnormal EKG:  Patient had a EKG in December 2018 which showed T wave inversion in V1 and V2. I reviewed this EKG personally which showed sinus rhythm. There was T wave inversion in lead V1 and V2 in setting of borderline prolongation of QRS segment. This could be incomplete right bundle branch block pattern. Pt has not expressed any concerning symptoms to caregiver. Echo with EF 50%, no RWMA, no significant valvular disease. Pt can follow-up in 1 year or as needed for any additional concerns.     End-stage renal disease:  Currently he is on dialysis and I will defer this management to renal team

## 2019-03-13 NOTE — PROGRESS NOTES
1. Have you been to the ER, urgent care clinic since your last visit? Hospitalized since your last visit? No    2. Have you seen or consulted any other health care providers outside of the [address] [place] [address] since your last visit? Include any pap smears or colon screening.  No